# Patient Record
Sex: FEMALE | Race: WHITE | Employment: OTHER | URBAN - METROPOLITAN AREA
[De-identification: names, ages, dates, MRNs, and addresses within clinical notes are randomized per-mention and may not be internally consistent; named-entity substitution may affect disease eponyms.]

---

## 2020-08-14 ENCOUNTER — HOSPITAL ENCOUNTER (INPATIENT)
Age: 45
LOS: 1 days | Discharge: LEFT BEFORE DISCHARGE | DRG: 812 | End: 2020-08-15
Attending: INTERNAL MEDICINE | Admitting: FAMILY MEDICINE
Payer: SUBSIDIZED

## 2020-08-14 VITALS
WEIGHT: 126.3 LBS | SYSTOLIC BLOOD PRESSURE: 115 MMHG | BODY MASS INDEX: 20.3 KG/M2 | HEIGHT: 66 IN | TEMPERATURE: 97.7 F | DIASTOLIC BLOOD PRESSURE: 82 MMHG | RESPIRATION RATE: 16 BRPM | OXYGEN SATURATION: 100 % | HEART RATE: 81 BPM

## 2020-08-14 PROBLEM — E03.9 HYPOTHYROID: Status: ACTIVE | Noted: 2020-08-14

## 2020-08-14 PROBLEM — K92.2 GI BLEED: Status: ACTIVE | Noted: 2020-08-14

## 2020-08-14 PROCEDURE — 77030040393 HC DRSG OPTIFOAM GENT MDII -B

## 2020-08-14 PROCEDURE — 65660000000 HC RM CCU STEPDOWN

## 2020-08-14 RX ORDER — MORPHINE SULFATE 2 MG/ML
1 INJECTION, SOLUTION INTRAMUSCULAR; INTRAVENOUS
Status: DISCONTINUED | OUTPATIENT
Start: 2020-08-14 | End: 2020-08-15 | Stop reason: HOSPADM

## 2020-08-14 RX ORDER — ALPRAZOLAM 1 MG/1
1 TABLET ORAL
COMMUNITY

## 2020-08-14 RX ORDER — ACETAMINOPHEN 325 MG/1
650 TABLET ORAL
Status: DISCONTINUED | OUTPATIENT
Start: 2020-08-14 | End: 2020-08-15 | Stop reason: HOSPADM

## 2020-08-14 RX ORDER — ACETAMINOPHEN 650 MG/1
650 SUPPOSITORY RECTAL
Status: DISCONTINUED | OUTPATIENT
Start: 2020-08-14 | End: 2020-08-15 | Stop reason: HOSPADM

## 2020-08-14 RX ORDER — LEVOTHYROXINE SODIUM 100 UG/1
100 TABLET ORAL
Status: DISCONTINUED | OUTPATIENT
Start: 2020-08-15 | End: 2020-08-15 | Stop reason: HOSPADM

## 2020-08-14 RX ORDER — ALPRAZOLAM 0.5 MG/1
1 TABLET ORAL 2 TIMES DAILY
Status: DISCONTINUED | OUTPATIENT
Start: 2020-08-15 | End: 2020-08-15 | Stop reason: HOSPADM

## 2020-08-14 RX ORDER — POLYETHYLENE GLYCOL 3350 17 G/17G
17 POWDER, FOR SOLUTION ORAL DAILY PRN
Status: DISCONTINUED | OUTPATIENT
Start: 2020-08-14 | End: 2020-08-15 | Stop reason: HOSPADM

## 2020-08-14 RX ORDER — SODIUM CHLORIDE 0.9 % (FLUSH) 0.9 %
5-40 SYRINGE (ML) INJECTION AS NEEDED
Status: DISCONTINUED | OUTPATIENT
Start: 2020-08-14 | End: 2020-08-15 | Stop reason: HOSPADM

## 2020-08-14 RX ORDER — PROMETHAZINE HYDROCHLORIDE 25 MG/1
12.5 TABLET ORAL
Status: DISCONTINUED | OUTPATIENT
Start: 2020-08-14 | End: 2020-08-15 | Stop reason: HOSPADM

## 2020-08-14 RX ORDER — LEVOTHYROXINE SODIUM 100 UG/1
100 TABLET ORAL
COMMUNITY

## 2020-08-14 RX ORDER — ONDANSETRON 2 MG/ML
4 INJECTION INTRAMUSCULAR; INTRAVENOUS
Status: DISCONTINUED | OUTPATIENT
Start: 2020-08-14 | End: 2020-08-15 | Stop reason: HOSPADM

## 2020-08-14 RX ORDER — SODIUM CHLORIDE 0.9 % (FLUSH) 0.9 %
5-40 SYRINGE (ML) INJECTION EVERY 8 HOURS
Status: DISCONTINUED | OUTPATIENT
Start: 2020-08-15 | End: 2020-08-15 | Stop reason: HOSPADM

## 2020-08-14 RX ORDER — POTASSIUM CHLORIDE, DEXTROSE MONOHYDRATE AND SODIUM CHLORIDE 300; 5; 900 MG/100ML; G/100ML; MG/100ML
INJECTION, SOLUTION INTRAVENOUS CONTINUOUS
Status: DISCONTINUED | OUTPATIENT
Start: 2020-08-14 | End: 2020-08-15 | Stop reason: HOSPADM

## 2020-08-15 PROBLEM — F41.9 ANXIETY DISORDER: Status: ACTIVE | Noted: 2020-08-15

## 2020-08-15 PROBLEM — K21.9 ACID REFLUX: Status: ACTIVE | Noted: 2020-08-15

## 2020-08-15 PROBLEM — E87.6 HYPOKALEMIA: Status: ACTIVE | Noted: 2020-08-15

## 2020-08-15 NOTE — H&P
Hospitalist H&P Note     Admit Date:  2020  7:34 PM   Name:  Adri Yu   Age:  54 y.o.  :  1975   MRN:  [id number]   PCP:  No primary care provider on file. Treatment Team: Attending Provider: Cedrick Win MD  Rectal bleeding generalized weakness passed out on Wednesday  HPI:   42-year-old  female patient with a known history of anxiety disorder on Xanax, hypothyroidism and history of depression not on any medication for a month. History of left shoulder pain. Patient says since  she has been taking Goody powder at least 3 times a week for pain, says has difficulty swallowing pills. Patient says in  she was diagnosed with squamous cell cancer, according to patient she was told that it was just above the esophagus. Patient says she finished chemo and radiation, at one point had a PEG tube. She was told that she had few years to live. Patient stopped the treatment, said went natural treatment and  m[facility] in [place]. According to patient oncology send the patient to ENT for reevaluation, when ENT scoped the patient he noticed that there was no more cancer. Patient says she has not followed up with oncology since then. With history of cancer/radiation patient does say that she has narrowing of her esophagus, has to chew a lot before swallowing. Says has previously had procedure for esophageal stretching. On Wednesday patient was walking her dog and suddenly felt severe weakness associated with the sweating, blurred vision and the next thing she noticed was she was found on the sidewalk. Patient family had to lift her and take her to the house. That evening patient noticed abdominal cramping mostly upper abdomen, noticed to have bloody bowel movement. Tin Conklin 8-10 episodes of rectal bleeding on Wednesday, 4 episodes of bloody bowel movement Thursday. Says did not have a bowel movement on Friday.   Still has intermittent crampy sometimes feeling of somebody punching in the abdomen, mostly over the upper abdominal region. Does complain of mild nausea. She was seen at the urgent care and sent as a direct admit for GI bleed. Hemoccult positive at the urgent care. Apart from patient complaining of severe fatigue/generalized weakness, abdominal pain more over the upper abdominal region and mild nausea, of the 10 reactions apart from the one mentioned above patient other review of stones were negative noncontributory. Hemoglobin 10.6, potassium of 3.3. Hemoccult positive at the urgent care. Patient will be admitted for symptomatic anemia and hypokalemia. 10 systems reviewed and negative except as noted in HPI.   past medical history -hypothyroidism, acid reflux, anxiety, depression  past surgical history -says had a previous esophageal stretching procedure  Allergies   Allergen Reactions    Hydrocodone Nausea and Vomiting    Penicillins Rash    Sulfa (Sulfonamide Antibiotics) Rash      Social History     Tobacco Use    Smoking status: NIL   Substance Use Topics    Alcohol use: NIL       family history- HTN    There is no immunization history on file for this patient. PTA Medications:  Prior to Admission Medications   Prescriptions Last Dose Informant Patient Reported? Taking? ALPRAZolam (Xanax) 1 mg tablet   Yes Yes   Sig: Take 1 mg by mouth two (2) times daily as needed for Anxiety. levothyroxine (Synthroid) 100 mcg tablet   Yes Yes   Sig: Take 100 mcg by mouth Daily (before breakfast). Facility-Administered Medications: None       Objective:     Patient Vitals for the past 24 hrs:   Temp Pulse Resp BP SpO2   08/14/20 2239 97.7 °F (36.5 °C) 81 16 115/82 100 %   08/14/20 1956 98.1 °F (36.7 °C) 93 19 110/79 100 %     Oxygen Therapy  O2 Sat (%): 100 % (08/14/20 2239)  No intake or output data in the 24 hours ending 08/15/20 0019    Physical Exam:  General:    Well nourished. Alert. Eyes:   Normal sclera. Extraocular movements intact.   Pale pink conjunctiva  ENT:  Normocephalic, atraumatic. Dry mucous membranes  CV:   RRR. No murmur, rub, or gallop. Lungs:  CTAB. No wheezing, rhonchi, or rales. Abdomen: Tenderness over the epigastric region with mild guarding no rebound, mild mid abdominal tenderness. Normal bowel sounds  Extremities: Warm and dry. No cyanosis or edema. Neurologic: CN II-XII grossly intact. Sensation intact. Skin:     No rashes or jaundice. Psych:  Normal mood and affect. I reviewed the labs, imaging, EKGs, telemetry, and other studies done this admission. Data Review:   No results found for this or any previous visit (from the past 24 hour(s)). All Micro Results     None          Other Studies:  No results found. Assessment and Plan:     Hospital Problems as of 8/15/2020 Never Reviewed          Codes Class Noted - Resolved POA    Hypokalemia ICD-10-CM: E87.6  ICD-9-CM: 276.8  8/15/2020 - Present Unknown        Acid reflux ICD-10-CM: K21.9  ICD-9-CM: 530.81  8/15/2020 - Present Unknown        Anxiety disorder ICD-10-CM: F41.9  ICD-9-CM: 300.00  8/15/2020 - Present Unknown        * (Principal) GI bleed ICD-10-CM: K92.2  ICD-9-CM: 578.9  8/14/2020 - Present Unknown        Hypothyroid ICD-10-CM: E03.9  ICD-9-CM: 244.9  8/14/2020 - Present Unknown              PLAN:  -GI bleed-probably upper GI etiology, may be related to patient taking Goody powder, continue Protonix IV twice daily, GI consult in the morning, n.p.o. after midnight.  -Hypokalemia-replace  -Acid reflux. Says history of esophageal stretching  -Hypothyroidism  -Anxiety disorder    [facility], full code.     DVT ppx: SCD  Anticipated DC needs:    Code status:  Full  Estimated LOS:  Greater than 2 midnights  Risk:  high    Signed:  Anabelle Dooley MD

## 2020-08-15 NOTE — PROGRESS NOTES
Pt left AMA. Saw patient at elevator with all her belongings as she was attempting to leave hospital without informing staff and against medical advice, explained to pt the importance of her staying and receiving treatment, however patient stated she wanted to go home and was \"leaving now\".  Had patient sign AMA paper and notified MD.

## 2020-08-15 NOTE — PROGRESS NOTES
I was told by the Nursing staff that he saw pt with all her belongings leaving the hospital,saw pt at the elevator. Staff told that pt signed AMA(against medical advise) papers. Earlier today, I explained the pt the importance of staying in the hospital secondary to her GI bleeding and pt was ok to stay and treated. Tried to call phone number on the chart- no answer.

## 2020-09-17 ENCOUNTER — ANESTHESIA EVENT (OUTPATIENT)
Dept: ENDOSCOPY | Age: 45
End: 2020-09-17
Payer: SUBSIDIZED

## 2020-09-17 RX ORDER — SODIUM CHLORIDE 0.9 % (FLUSH) 0.9 %
5-40 SYRINGE (ML) INJECTION AS NEEDED
Status: CANCELLED | OUTPATIENT
Start: 2020-09-17

## 2020-09-17 RX ORDER — SODIUM CHLORIDE 0.9 % (FLUSH) 0.9 %
5-40 SYRINGE (ML) INJECTION EVERY 8 HOURS
Status: CANCELLED | OUTPATIENT
Start: 2020-09-17

## 2020-09-17 RX ORDER — SODIUM CHLORIDE, SODIUM LACTATE, POTASSIUM CHLORIDE, CALCIUM CHLORIDE 600; 310; 30; 20 MG/100ML; MG/100ML; MG/100ML; MG/100ML
100 INJECTION, SOLUTION INTRAVENOUS CONTINUOUS
Status: CANCELLED | OUTPATIENT
Start: 2020-09-17 | End: 2020-09-18

## 2020-09-17 RX ORDER — SODIUM CHLORIDE, SODIUM LACTATE, POTASSIUM CHLORIDE, CALCIUM CHLORIDE 600; 310; 30; 20 MG/100ML; MG/100ML; MG/100ML; MG/100ML
100 INJECTION, SOLUTION INTRAVENOUS CONTINUOUS
Status: CANCELLED | OUTPATIENT
Start: 2020-09-17

## 2020-09-17 RX ORDER — LIDOCAINE HYDROCHLORIDE 10 MG/ML
0.1 INJECTION INFILTRATION; PERINEURAL AS NEEDED
Status: CANCELLED | OUTPATIENT
Start: 2020-09-17

## 2020-09-18 ENCOUNTER — ANESTHESIA (OUTPATIENT)
Dept: ENDOSCOPY | Age: 45
End: 2020-09-18
Payer: SUBSIDIZED

## 2020-09-18 ENCOUNTER — HOSPITAL ENCOUNTER (OUTPATIENT)
Age: 45
Setting detail: OUTPATIENT SURGERY
Discharge: HOME | End: 2020-09-18
Attending: INTERNAL MEDICINE | Admitting: INTERNAL MEDICINE
Payer: SUBSIDIZED

## 2020-09-18 VITALS
RESPIRATION RATE: 20 BRPM | SYSTOLIC BLOOD PRESSURE: 112 MMHG | TEMPERATURE: 98.6 F | HEART RATE: 78 BPM | DIASTOLIC BLOOD PRESSURE: 83 MMHG | OXYGEN SATURATION: 90 %

## 2020-09-18 PROCEDURE — 74011250636 HC RX REV CODE- 250/636: Performed by: NURSE ANESTHETIST, CERTIFIED REGISTERED

## 2020-09-18 PROCEDURE — 88305 TISSUE EXAM BY PATHOLOGIST: CPT

## 2020-09-18 PROCEDURE — 2709999900 HC NON-CHARGEABLE SUPPLY: Performed by: INTERNAL MEDICINE

## 2020-09-18 PROCEDURE — 77030020018 HC MRKR ENDOSC SPOT 5ML SYR GISP -B: Performed by: INTERNAL MEDICINE

## 2020-09-18 PROCEDURE — 77030013991 HC SNR POLYP ENDOSC BSC -A: Performed by: INTERNAL MEDICINE

## 2020-09-18 PROCEDURE — 77030029929: Performed by: INTERNAL MEDICINE

## 2020-09-18 PROCEDURE — 76040000027: Performed by: INTERNAL MEDICINE

## 2020-09-18 PROCEDURE — 76060000034 HC ANESTHESIA 1.5 TO 2 HR: Performed by: INTERNAL MEDICINE

## 2020-09-18 PROCEDURE — 74011000250 HC RX REV CODE- 250: Performed by: NURSE ANESTHETIST, CERTIFIED REGISTERED

## 2020-09-18 RX ORDER — CYCLOBENZAPRINE HCL 5 MG
5 TABLET ORAL
COMMUNITY
End: 2021-02-17

## 2020-09-18 RX ORDER — PROPOFOL 10 MG/ML
INJECTION, EMULSION INTRAVENOUS
Status: DISCONTINUED | OUTPATIENT
Start: 2020-09-18 | End: 2020-09-18 | Stop reason: HOSPADM

## 2020-09-18 RX ORDER — LIDOCAINE HYDROCHLORIDE 20 MG/ML
INJECTION, SOLUTION EPIDURAL; INFILTRATION; INTRACAUDAL; PERINEURAL AS NEEDED
Status: DISCONTINUED | OUTPATIENT
Start: 2020-09-18 | End: 2020-09-18 | Stop reason: HOSPADM

## 2020-09-18 RX ORDER — PROPOFOL 10 MG/ML
INJECTION, EMULSION INTRAVENOUS AS NEEDED
Status: DISCONTINUED | OUTPATIENT
Start: 2020-09-18 | End: 2020-09-18 | Stop reason: HOSPADM

## 2020-09-18 RX ORDER — SODIUM CHLORIDE, SODIUM LACTATE, POTASSIUM CHLORIDE, CALCIUM CHLORIDE 600; 310; 30; 20 MG/100ML; MG/100ML; MG/100ML; MG/100ML
INJECTION, SOLUTION INTRAVENOUS
Status: DISCONTINUED | OUTPATIENT
Start: 2020-09-18 | End: 2020-09-18 | Stop reason: HOSPADM

## 2020-09-18 RX ORDER — DULOXETIN HYDROCHLORIDE 20 MG/1
20 CAPSULE, DELAYED RELEASE ORAL DAILY
COMMUNITY
End: 2021-02-17

## 2020-09-18 RX ADMIN — PROPOFOL 60 MG: 10 INJECTION, EMULSION INTRAVENOUS at 12:34

## 2020-09-18 RX ADMIN — PROPOFOL 30 MG: 10 INJECTION, EMULSION INTRAVENOUS at 12:54

## 2020-09-18 RX ADMIN — SODIUM CHLORIDE, SODIUM LACTATE, POTASSIUM CHLORIDE, AND CALCIUM CHLORIDE: 600; 310; 30; 20 INJECTION, SOLUTION INTRAVENOUS at 12:30

## 2020-09-18 RX ADMIN — PROPOFOL 10 MG: 10 INJECTION, EMULSION INTRAVENOUS at 12:36

## 2020-09-18 RX ADMIN — LIDOCAINE HYDROCHLORIDE 100 MG: 20 INJECTION, SOLUTION EPIDURAL; INFILTRATION; INTRACAUDAL; PERINEURAL at 12:34

## 2020-09-18 RX ADMIN — PROPOFOL 100 MCG/KG/MIN: 10 INJECTION, EMULSION INTRAVENOUS at 12:34

## 2020-09-18 NOTE — H&P
History and Physical      Patient: [facility]    Physician: Tramaine Sanchez MD    Referring Physician: Danielle Allen NP    Chief Complaint: For colonoscopy and EGD    History of Present Illness: Pt presents for colonoscopy. Pt with lower abdominal pain, constipation, rectal bleeding, Nausea, GERD, and dysphagia with hx of esophageal cancer. History:  Past Medical History:   Diagnosis Date    Cancer ([address]     Throat tumor    GERD (gastroesophageal reflux disease)     Ill-defined condition     Episodes of fainting    Psychiatric disorder      Past Surgical History:   Procedure Laterality Date    HX GYN      tubal    HX OTHER SURGICAL      EGD with dilation      Social History     Socioeconomic History    Marital status: LEGALLY      Spouse name: Not on file    Number of children: Not on file    Years of education: Not on file    Highest education level: Not on file   Tobacco Use    Smoking status: Current Some Day Smoker    Smokeless tobacco: Never Used   Substance and Sexual Activity    Alcohol use: Never     Frequency: Never      History reviewed. No pertinent family history. Medications:   Prior to Admission medications    Medication Sig Start Date End Date Taking? Authorizing Provider   DULoxetine (Cymbalta) 20 mg capsule Take 20 mg by mouth daily. Yes Provider, Historical   cyclobenzaprine (FLEXERIL) 5 mg tablet Take 5 mg by mouth. Yes Provider, Historical   levothyroxine (Synthroid) 100 mcg tablet Take 100 mcg by mouth Daily (before breakfast). Yes Provider, Historical   ALPRAZolam (Xanax) 1 mg tablet Take 1 mg by mouth two (2) times daily as needed for Anxiety. Yes Provider, Historical       Allergies:    Allergies   Allergen Reactions    Adhesive Rash    Hydrocodone Nausea and Vomiting    Penicillins Rash    Sulfa (Sulfonamide Antibiotics) Rash       Physical Exam:     Vital Signs:   Visit Vitals  BP 99/66   Pulse 88   Temp 97.9 °F (36.6 °C) Resp 16   SpO2 98%   Breastfeeding Yes     . General: no distress      Heart: regular   Lungs: unlabored   Abdominal: soft   Neurological: Grossly normal        Findings/Diagnosis: Abdominal pain, Rectal bleed, Constipation, GERD, Nausea, Dysphagia    Plan of Care/Planned Procedure: Colonoscopy, possible polypectomy and EGD with dilation. Pt/designee has reviewed the colonoscopy information sheet. Any questions have been discussed. They agree to proceed.       Signed:  Vern Hamlin MD   9/18/2020

## 2020-09-18 NOTE — ANESTHESIA POSTPROCEDURE EVALUATION
Procedure(s):  ESOPHAGOGASTRODUODENOSCOPY (EGD) WITH DILATION  COLONOSCOPY/ 20  ESOPHAGEAL DILATION Savory Dilators  ENDOSCOPIC POLYPECTOMY  INJECTION tattoo.     total IV anesthesia    Anesthesia Post Evaluation        Patient location during evaluation: PACU  Patient participation: complete - patient participated  Level of consciousness: awake and alert  Pain management: adequate  Airway patency: patent  Anesthetic complications: no  Cardiovascular status: acceptable  Respiratory status: acceptable  Hydration status: acceptable  Post anesthesia nausea and vomiting:  controlled  Final Post Anesthesia Temperature Assessment:  Normothermia (36.0-37.5 degrees C)      INITIAL Post-op Vital signs:   Vitals Value Taken Time   /83 9/18/2020  2:37 PM   Temp 37 °C (98.6 °F) 9/18/2020  2:03 PM   Pulse 78 9/18/2020  2:37 PM   Resp 20 9/18/2020  2:37 PM   SpO2 90 % 9/18/2020  2:37 PM

## 2020-09-18 NOTE — DISCHARGE INSTRUCTIONS
Gastrointestinal Colonoscopy/Flexible Sigmoidoscopy - Lower Exam Discharge Instructions  1. Call Dr. Jeff Funes office for any problems or questions. 2. Contact the doctors office for follow up appointment as directed  3. Medication may cause drowsiness for several hours, therefore:  · Do not drive or operate machinery for reminder of the day. · No alcohol today. · Do not make any important or legal decisions for 24 hours. · Do not sign any legal documents for 24 hours. 4. Ordinarily, you may resume regular diet and activity after exam unless otherwise specified by your physician. 5. Because of air put into your colon during exam, you may experience some abdominal distension, relieved by the passage of gas, for several hours. 6. Contact your physician if you have any of the following:  · Excessive amount of bleeding - large amount when having a bowel movement. Occasional specks of blood with bowel movement would not be unusual.  · Severe abdominal pain  · Fever or Chills  7. Polyp Removal - follow these additional instructions  · Clear liquid diet for the next meal (jell-o, broth, clear drinks)  · Soft diet for 24 hours, then resume regular diet   · Take Metamucil - 1 tablespoon in juice every morning for 3 days  · No Aspirin, Advil, Aleve, Nuprin, Ibuprofen, or medications that contain these drugs for 2 weeks. Any additional instructions:    1. Office will follow up with pathology  2. Repeat colonoscopy in six months             . Gastrointestinal Esophagogastroduodenoscopy (EGD) - Upper Exam Discharge Instructions    1. Call Dr. Jeff Funes office for any problems or questions. 2. Contact the doctor's office for follow up appointment as directed. 3. Medication may cause drowsiness for several hours, therefore:  · Do not drive or operate machinery for remainder of the day. · No alcohol today. · Do not make any important or legal decisions for 24 hours.   · Do not sign any legal documents for 24 hours.    5. Ordinarily, you may resume regular diet and activity after exam unless otherwise              specified by your physician. 6. For mild soreness in your throat you may use Cepacol throat lozenges or warm               salt-water gargles as needed. Any additional instructions: 1. Soft diet today, advance as tolerated  2. Repeat dilation in six weeks. Instructions given to [facility] and other family members.

## 2020-09-18 NOTE — PROCEDURES
DATE OF PROCEDURE: 9/18/2020    REQUESTING PHYSICIAN: Shanthi Lawson    PROCEDURE: Colonoscopy. ENDOSCOPIST: Levonia Mcardle, M.D. PREOPERATIVE DIAGNOSIS: Lower abdominal pain, Constipation, Rectal bleed    POSTOPERATIVE DIAGNOSIS: Multiple polys, Diverticulosis, Internal hemorrhoids    SEDATION: MAC    INSTRUMENT: PCF H190    DESCRIPTION OF PROCEDURE:  After informed consent was obtained the patient was placed in the left lateral decubitus position. Intravenous sedation was administered as above. After adequate sedation had been achieved, digital rectal examination was performed. The colonoscope was then inserted through the anus and followed the course of the rectum and colon to the cecum, which was confirmed by visualization of the ileocecal valve and appendiceal orifice. The colonoscope was withdrawn from that point, performing a careful survey of the mucosa. Retroflexion was performed in the rectum. FINDINGS:  Normal appearing mucosa from rectum to cecum without inflammation. Normal TI mucosa. Left sided diverticulosis  Internal hemorrhoids  Ascending colon: 10mm, 9mm, 11mm, 10mm polyps- hot and cold snare. Transverse colon: 20mm (hot snare in pieces), 8mm polyp- cold snare. Descending colon: 12mm, 8mm polyps- hot and cold snare. Sigmoid colon: Six smaller polyps (5-7mm)- cold snare. Multiple smaller polyps left in place.     Estimated Blood Loss:  0 cc-min    IMPRESSION:  Multiple colon polyps  Diverticulosis  Internal hemorrhoids     PLAN:  - F/u path  - Repeat colo in 6mos  - Consider genetic testing     REINALDO Sheppard MD

## 2020-09-18 NOTE — ANESTHESIA PREPROCEDURE EVALUATION
Relevant Problems   No relevant active problems       Anesthetic History   No history of anesthetic complications            Review of Systems / Medical History  Patient summary reviewed and pertinent labs reviewed    Pulmonary          Smoker         Neuro/Psych         Psychiatric history     Cardiovascular                  Exercise tolerance: >4 METS     GI/Hepatic/Renal     GERD           Endo/Other      Hypothyroidism: well controlled    Pertinent negatives: Cancer: throat 15 years ago.     Other Findings              Physical Exam    Airway  Mallampati: II  TM Distance: 4 - 6 cm  Neck ROM: normal range of motion   Mouth opening: Normal     Cardiovascular  Regular rate and rhythm,  S1 and S2 normal,  no murmur, click, rub, or gallop             Dental    Dentition: Full upper dentures     Pulmonary  Breath sounds clear to auscultation               Abdominal         Other Findings            Anesthetic Plan    ASA: 3  Anesthesia type: total IV anesthesia          Induction: Intravenous  Anesthetic plan and risks discussed with: Patient

## 2020-09-18 NOTE — ROUTINE PROCESS
VSS. Patient denies any complaints at this time. Discharge education provided to patient and fiance. Patient discharged via wheelchair with RN.

## 2020-09-18 NOTE — PROCEDURES
DATE OF PROCEDURE: 9/18/2020    REQUESTING PHYSICIAN: Shanthi Lawson    PROCEDURE: Esophagogastroduodenoscopy. ENDOSCOPIST: Anthony Belcher M.D. PREOPERATIVE DIAGNOSIS: Dysphagia, GERD, Nausea, Hx of esophageal cancer    POSTOPERATIVE DIAGNOSIS: Esophageal stricture    INSTRUMENTS: GIF H190, XP    SEDATION: MAC    DESCRIPTION: After informed consent was obtained, the patient was taken to the endoscopy suite and placed in the left lateral decubitus position. Intravenous sedation was administered by the Anesthesia provider. After adequate sedation had been achieved the endoscope was inserted over the tongue and through the posterior pharynx under direct visualization down the esophagus to the stomach and into the second portion of the duodenum. The endoscopic was withdrawn from that point, performing a careful survey of the mucosa. Retroflexion was performed in the gastric fundus. FINDINGS:  Esophagus: Upon intubating the esophagus, there was very tight, benign-appearing, stricture for which the standard EGD scope could not traverse. Therefore, I changed to an XP scope and dilated with a Savary (10,11,12,12.8mm) with expected heme/trauma present. Stomach: Normal gastric mucosa. Duodenum: Normal duodenal mucosa.      Estimated blood loss:  0 cc-minimal    IMPRESSION:   Esophageal stricture    PLAN:  - Soft diet today, advance as tolerated  - Repeat dilation in 6wks  - After next dilation, start OTC omeprazole 20mg daily    REINALDO Guan MD

## 2020-10-29 ENCOUNTER — HOSPITAL ENCOUNTER (OUTPATIENT)
Dept: SURGERY | Age: 45
Discharge: HOME | End: 2020-10-29

## 2021-01-04 ENCOUNTER — HOSPITAL ENCOUNTER (OUTPATIENT)
Dept: SURGERY | Age: 46
Discharge: HOME | End: 2021-01-04

## 2021-02-12 ENCOUNTER — HOSPITAL ENCOUNTER (OUTPATIENT)
Dept: SURGERY | Age: 46
Discharge: HOME | End: 2021-02-12

## 2021-02-17 VITALS — WEIGHT: 125 LBS | BODY MASS INDEX: 20.09 KG/M2 | HEIGHT: 66 IN

## 2021-02-17 RX ORDER — CITALOPRAM 40 MG/1
40 TABLET, FILM COATED ORAL DAILY
COMMUNITY

## 2021-02-17 NOTE — PERIOP NOTES
Patient verified name, , and procedure.    Type: 1a; abbreviated assessment per anesthesia guidelines  Labs per surgeon: none  Labs per anesthesia: none    A negative Covid swab result is required to proceed with surgery; appointments are made by the surgeon office and test should be collected 7 days prior to surgery. The testing center is located at the [address]     Instructed pt that they will be notified by the doctor office for time of arrival to GI lab. If any questions please call the GI lab at [phone].    Follow diet and prep instructions per office. May have clear liquids until 4 hours prior to time of arrival.    Bath or shower the night before and the am of surgery with antibacterial soap. No lotions, oils, powders, cologne on skin. No make up, eye make up or jewelry. Wear loose fitting comfortable, clean clothing.     Must have adult present in building the entire time .     Medications for the day of procedure Xanax prn, Celexa, Synthroid    The following discharge instructions reviewed with patient: medication given during procedure may cause drowsiness for several hours, therefore, do not drive or operate machinery for remainder of the day, no alcohol on the day of your procedure, resume regular diet and activity unless otherwise directed, for mild sore throat you may use Cepacol throat lozenges or warm salt water gargles as needed, call your physician for any problems or questions. Patient verbalizes understanding.

## 2021-02-19 ENCOUNTER — ANESTHESIA (OUTPATIENT)
Dept: ENDOSCOPY | Age: 46
End: 2021-02-19
Payer: SUBSIDIZED

## 2021-02-19 ENCOUNTER — HOSPITAL ENCOUNTER (OUTPATIENT)
Age: 46
Setting detail: OUTPATIENT SURGERY
Discharge: HOME | End: 2021-02-19
Attending: INTERNAL MEDICINE | Admitting: INTERNAL MEDICINE
Payer: SUBSIDIZED

## 2021-02-19 ENCOUNTER — ANESTHESIA EVENT (OUTPATIENT)
Dept: ENDOSCOPY | Age: 46
End: 2021-02-19
Payer: SUBSIDIZED

## 2021-02-19 VITALS
BODY MASS INDEX: 20.87 KG/M2 | RESPIRATION RATE: 18 BRPM | DIASTOLIC BLOOD PRESSURE: 63 MMHG | WEIGHT: 129.3 LBS | HEART RATE: 87 BPM | SYSTOLIC BLOOD PRESSURE: 106 MMHG | OXYGEN SATURATION: 100 % | TEMPERATURE: 98 F

## 2021-02-19 PROCEDURE — 2709999900 HC NON-CHARGEABLE SUPPLY: Performed by: INTERNAL MEDICINE

## 2021-02-19 PROCEDURE — 76040000025: Performed by: INTERNAL MEDICINE

## 2021-02-19 PROCEDURE — 74011250636 HC RX REV CODE- 250/636: Performed by: ANESTHESIOLOGY

## 2021-02-19 PROCEDURE — 74011250636 HC RX REV CODE- 250/636

## 2021-02-19 PROCEDURE — 76060000031 HC ANESTHESIA FIRST 0.5 HR: Performed by: INTERNAL MEDICINE

## 2021-02-19 RX ORDER — ONDANSETRON 2 MG/ML
4 INJECTION INTRAMUSCULAR; INTRAVENOUS ONCE
Status: DISCONTINUED | OUTPATIENT
Start: 2021-02-19 | End: 2021-02-19 | Stop reason: HOSPADM

## 2021-02-19 RX ORDER — OXYCODONE HYDROCHLORIDE 5 MG/1
5 TABLET ORAL
Status: DISCONTINUED | OUTPATIENT
Start: 2021-02-19 | End: 2021-02-19 | Stop reason: HOSPADM

## 2021-02-19 RX ORDER — LIDOCAINE HYDROCHLORIDE 10 MG/ML
0.1 INJECTION INFILTRATION; PERINEURAL AS NEEDED
Status: DISCONTINUED | OUTPATIENT
Start: 2021-02-19 | End: 2021-02-19 | Stop reason: HOSPADM

## 2021-02-19 RX ORDER — FENTANYL CITRATE 50 UG/ML
100 INJECTION, SOLUTION INTRAMUSCULAR; INTRAVENOUS AS NEEDED
Status: DISCONTINUED | OUTPATIENT
Start: 2021-02-19 | End: 2021-02-19 | Stop reason: HOSPADM

## 2021-02-19 RX ORDER — NALOXONE HYDROCHLORIDE 0.4 MG/ML
0.1 INJECTION, SOLUTION INTRAMUSCULAR; INTRAVENOUS; SUBCUTANEOUS AS NEEDED
Status: DISCONTINUED | OUTPATIENT
Start: 2021-02-19 | End: 2021-02-19 | Stop reason: HOSPADM

## 2021-02-19 RX ORDER — DIPHENHYDRAMINE HYDROCHLORIDE 50 MG/ML
12.5 INJECTION, SOLUTION INTRAMUSCULAR; INTRAVENOUS ONCE
Status: DISCONTINUED | OUTPATIENT
Start: 2021-02-19 | End: 2021-02-19 | Stop reason: HOSPADM

## 2021-02-19 RX ORDER — HYDROMORPHONE HYDROCHLORIDE 2 MG/ML
0.5 INJECTION, SOLUTION INTRAMUSCULAR; INTRAVENOUS; SUBCUTANEOUS
Status: DISCONTINUED | OUTPATIENT
Start: 2021-02-19 | End: 2021-02-19 | Stop reason: HOSPADM

## 2021-02-19 RX ORDER — PROPOFOL 10 MG/ML
INJECTION, EMULSION INTRAVENOUS AS NEEDED
Status: DISCONTINUED | OUTPATIENT
Start: 2021-02-19 | End: 2021-02-19 | Stop reason: HOSPADM

## 2021-02-19 RX ORDER — OXYCODONE HYDROCHLORIDE 5 MG/1
10 TABLET ORAL
Status: DISCONTINUED | OUTPATIENT
Start: 2021-02-19 | End: 2021-02-19 | Stop reason: HOSPADM

## 2021-02-19 RX ORDER — SODIUM CHLORIDE, SODIUM LACTATE, POTASSIUM CHLORIDE, CALCIUM CHLORIDE 600; 310; 30; 20 MG/100ML; MG/100ML; MG/100ML; MG/100ML
1000 INJECTION, SOLUTION INTRAVENOUS CONTINUOUS
Status: DISCONTINUED | OUTPATIENT
Start: 2021-02-19 | End: 2021-02-19 | Stop reason: HOSPADM

## 2021-02-19 RX ORDER — PROPOFOL 10 MG/ML
INJECTION, EMULSION INTRAVENOUS
Status: DISCONTINUED | OUTPATIENT
Start: 2021-02-19 | End: 2021-02-19 | Stop reason: HOSPADM

## 2021-02-19 RX ORDER — SODIUM CHLORIDE, SODIUM LACTATE, POTASSIUM CHLORIDE, CALCIUM CHLORIDE 600; 310; 30; 20 MG/100ML; MG/100ML; MG/100ML; MG/100ML
75 INJECTION, SOLUTION INTRAVENOUS CONTINUOUS
Status: DISCONTINUED | OUTPATIENT
Start: 2021-02-19 | End: 2021-02-19 | Stop reason: HOSPADM

## 2021-02-19 RX ORDER — MIDAZOLAM HYDROCHLORIDE 1 MG/ML
2 INJECTION, SOLUTION INTRAMUSCULAR; INTRAVENOUS
Status: DISCONTINUED | OUTPATIENT
Start: 2021-02-19 | End: 2021-02-19 | Stop reason: HOSPADM

## 2021-02-19 RX ADMIN — SODIUM CHLORIDE, SODIUM LACTATE, POTASSIUM CHLORIDE, AND CALCIUM CHLORIDE 1000 ML: 600; 310; 30; 20 INJECTION, SOLUTION INTRAVENOUS at 12:41

## 2021-02-19 RX ADMIN — PROPOFOL 70 MG: 10 INJECTION, EMULSION INTRAVENOUS at 13:05

## 2021-02-19 RX ADMIN — PROPOFOL 140 MCG/KG/MIN: 10 INJECTION, EMULSION INTRAVENOUS at 13:03

## 2021-02-19 NOTE — H&P
History and Physical      Patient: Danielle Hinton    Physician: Dixie Rivera MD    Referring Physician: Ria Gomez NP    Chief Complaint: For EGD    History of Present Illness: Pt presents for EGD. Pt with esophageal stricture with dysphagia. History:  Past Medical History:   Diagnosis Date    Anxiety     Cancer ([address] 11/05/2019    Throat tumor. Diagnosed 2006 was not surgical- had radiation. Was sent home on hospice. In remission- went to a [facility] warrior and was healed per Care Everywhere pt summary    GERD (gastroesophageal reflux disease)     Hypothyroid     Ill-defined condition     Episodes of fainting     Past Surgical History:   Procedure Laterality Date    COLONOSCOPY N/A 9/18/2020    COLONOSCOPY/ 20 performed by [facility] at [facility] ENDOSCOPY     [address] HX CHOLECYSTECTOMY      HX GI      PEG place - later removed    HX OTHER SURGICAL  09/18/2020    EGD with dilation    HX TUBAL LIGATION      HX VASCULAR ACCESS      Port placed - later removed      Social History     Socioeconomic History    Marital status: LEGALLY      Spouse name: Not on file    Number of children: Not on file    Years of education: Not on file    Highest education level: Not on file   Tobacco Use    Smoking status: Current Some Day Smoker    Smokeless tobacco: Never Used   Substance and Sexual Activity    Alcohol use: Never     Frequency: Never    Drug use: Not Currently      History reviewed. No pertinent family history. Medications:   Prior to Admission medications    Medication Sig Start Date End Date Taking? Authorizing Provider   OTHER,NON-FORMULARY, colloidial silver, essential oils and aloe vera dialy   Yes Provider, Historical   levothyroxine (Synthroid) 100 mcg tablet Take 100 mcg by mouth Daily (before breakfast). Take / use AM day of surgery  per anesthesia protocols.    Yes Provider, Historical   ALPRAZolam (Xanax) 1 mg tablet Take 1 mg by mouth two (2) times daily as needed for Anxiety. Take / use AM day of surgery  per anesthesia protocols PRN   Yes Provider, Historical   citalopram (CeleXA) 40 mg tablet Take 40 mg by mouth daily. Take / use AM day of surgery  per anesthesia protocols. Provider, Historical       Allergies: Allergies   Allergen Reactions    Adhesive Rash    Hydrocodone Nausea and Vomiting    Penicillins Rash    Sulfa (Sulfonamide Antibiotics) Rash       Physical Exam:     Vital Signs:   Visit Vitals  /71   Pulse 98   Temp 97.9 °F (36.6 °C)   Resp 18   Wt 58.7 kg (129 lb 4.8 oz)   SpO2 99%   BMI 20.87 kg/m²     . General: no distress      Heart: regular   Lungs: unlabored   Abdominal: soft   Neurological: Grossly normal        Findings/Diagnosis: Dysphagia, Esophageal stricture    Plan of Care/Planned Procedure: EGD with dilation. Pt/designee has reviewed the colonoscopy information sheet. Any questions have been discussed. They agree to proceed.       Signed:  Alyssa Aguilar MD   2/19/2021

## 2021-02-19 NOTE — ANESTHESIA PREPROCEDURE EVALUATION
Relevant Problems   NEUROLOGY   (+) Anxiety disorder      GASTROINTESTINAL   (+) Acid reflux      ENDOCRINE   (+) Hypothyroid       Anesthetic History   No history of anesthetic complications            Review of Systems / Medical History  Patient summary reviewed and pertinent labs reviewed    Pulmonary          Smoker         Neuro/Psych         Psychiatric history     Cardiovascular                  Exercise tolerance: >4 METS     GI/Hepatic/Renal     GERD           Endo/Other      Hypothyroidism: well controlled    Pertinent negatives: Cancer: throat 15 years ago.     Other Findings              Physical Exam    Airway  Mallampati: II  TM Distance: 4 - 6 cm  Neck ROM: normal range of motion   Mouth opening: Normal     Cardiovascular  Regular rate and rhythm,  S1 and S2 normal,  no murmur, click, rub, or gallop             Dental    Dentition: Full upper dentures     Pulmonary  Breath sounds clear to auscultation               Abdominal         Other Findings            Anesthetic Plan    ASA: 3  Anesthesia type: total IV anesthesia          Induction: Intravenous  Anesthetic plan and risks discussed with: Patient

## 2021-02-19 NOTE — PROCEDURES
PROCEDURE: Esophagogastroduodenoscopy    ENDOSCOPIST: Noé Kelley M.D. PREOPERATIVE DIAGNOSIS: Dysphagia, Esophageal stricture     POSTOPERATIVE DIAGNOSIS:     INSTRUMENTS: GIF H190    SEDATION: MAC    DESCRIPTION: After informed consent was obtained, the patient was taken to the endoscopy suite and placed in the left lateral decubitus position. Intravenous sedation was administered by the Anesthesia provider. After adequate sedation had been achieved the endoscope was inserted over the tongue and through the posterior pharynx under direct visualization down the esophagus to the stomach and into the second portion of the duodenum. The endoscopic was withdrawn from that point, performing a careful survey of the mucosa. Retroflexion was performed in the gastric fundus. FINDINGS:  Esophagus: Proximal stricture again seen but the scope was able to traverse (improved) with some resistance and mild trauma. Expected heme/trauma present after passing Savary (12,13,14mm). Therefore, no further dilation done. Stomach: Normal gastric mucosa. Duodenum: Normal duodenal mucosa.      Estimated blood loss:  0 cc-minimal    IMPRESSION:   Esophageal stricture (radiation induced)    PLAN:  - Soft diet today, advance as tolerated  - Will arrange repeat dilation with colonoscopy  - Will consider genetic testing    REINALDO Burnett MD

## 2021-02-19 NOTE — DISCHARGE INSTRUCTIONS
Gastrointestinal Esophagogastroduodenoscopy (EGD) - Upper Exam Discharge Instructions    1. Call Dr. Nicci Lawton at office for any problems or questions. 2. Contact the doctor's office for follow up appointment as directed. 3. Medication may cause drowsiness for several hours, therefore:  · Do not drive or operate machinery for remainder of the day. · No alcohol today. · Do not make any important or legal decisions for 24 hours. · Do not sign any legal documents for 24 hours. 5. Ordinarily, you may resume regular diet and activity after exam unless otherwise              specified by your physician. 6. For mild soreness in your throat you may use Cepacol throat lozenges or warm               salt-water gargles as needed. Any additional instructions:  ***     Instructions given to [employer] and other family members.   Instructions given by:  Yina Valderrama RN

## 2021-02-19 NOTE — ANESTHESIA POSTPROCEDURE EVALUATION
Procedure(s):  ESOPHAGOGASTRODUODENOSCOPY (EGD) WITH DILATION/BMI 20  ESOPHAGEAL DILATION.    total IV anesthesia    Anesthesia Post Evaluation        Patient location during evaluation: bedside  Patient participation: complete - patient participated  Level of consciousness: responsive to verbal stimuli  Pain management: satisfactory to patient  Airway patency: patent  Anesthetic complications: no  Cardiovascular status: hemodynamically stable  Respiratory status: spontaneous ventilation  Hydration status: stable    Final Post Anesthesia Temperature Assessment:  Normothermia (36.0-37.5 degrees C)      INITIAL Post-op Vital signs:   Vitals Value Taken Time   /64 02/19/21 1331   Temp 36.2 °C (97.2 °F) 02/19/21 1319   Pulse 89 02/19/21 1331   Resp 18 02/19/21 1331   SpO2 100 % 02/19/21 1331

## 2021-03-22 NOTE — PROGRESS NOTES
Called patient and left a voicemail to call [phone] [facility] Endoscopy department. Patient did not show for her covid test that was scheduled 3/17/21.

## 2021-03-23 ENCOUNTER — ANESTHESIA EVENT (OUTPATIENT)
Dept: ENDOSCOPY | Age: 46
End: 2021-03-23
Payer: SUBSIDIZED

## 2021-03-23 NOTE — PROGRESS NOTES
Informed patient of [phone] arrival time for 1400 procedure. Informed patient of COVID protocols. Informed patient that they must have a  with them the entire time that they are in the hospital. Patient verbalized understanding.

## 2021-03-24 ENCOUNTER — HOSPITAL ENCOUNTER (OUTPATIENT)
Age: 46
Setting detail: OUTPATIENT SURGERY
Discharge: HOME | End: 2021-03-24
Attending: INTERNAL MEDICINE | Admitting: INTERNAL MEDICINE
Payer: SUBSIDIZED

## 2021-03-24 ENCOUNTER — ANESTHESIA (OUTPATIENT)
Dept: ENDOSCOPY | Age: 46
End: 2021-03-24
Payer: SUBSIDIZED

## 2021-03-24 VITALS
TEMPERATURE: 97.7 F | HEIGHT: 66 IN | SYSTOLIC BLOOD PRESSURE: 101 MMHG | OXYGEN SATURATION: 98 % | DIASTOLIC BLOOD PRESSURE: 73 MMHG | WEIGHT: 127 LBS | RESPIRATION RATE: 17 BRPM | HEART RATE: 79 BPM | BODY MASS INDEX: 20.41 KG/M2

## 2021-03-24 PROCEDURE — 2709999900 HC NON-CHARGEABLE SUPPLY: Performed by: INTERNAL MEDICINE

## 2021-03-24 PROCEDURE — 74011250636 HC RX REV CODE- 250/636: Performed by: ANESTHESIOLOGY

## 2021-03-24 PROCEDURE — 88305 TISSUE EXAM BY PATHOLOGIST: CPT

## 2021-03-24 PROCEDURE — 76040000026: Performed by: INTERNAL MEDICINE

## 2021-03-24 PROCEDURE — 77030021593 HC FCPS BIOP ENDOSC BSC -A: Performed by: INTERNAL MEDICINE

## 2021-03-24 PROCEDURE — 76060000032 HC ANESTHESIA 0.5 TO 1 HR: Performed by: INTERNAL MEDICINE

## 2021-03-24 PROCEDURE — 77030013991 HC SNR POLYP ENDOSC BSC -A: Performed by: INTERNAL MEDICINE

## 2021-03-24 PROCEDURE — 74011000250 HC RX REV CODE- 250: Performed by: NURSE ANESTHETIST, CERTIFIED REGISTERED

## 2021-03-24 PROCEDURE — 88312 SPECIAL STAINS GROUP 1: CPT

## 2021-03-24 PROCEDURE — 74011250636 HC RX REV CODE- 250/636: Performed by: NURSE ANESTHETIST, CERTIFIED REGISTERED

## 2021-03-24 RX ORDER — PROPOFOL 10 MG/ML
INJECTION, EMULSION INTRAVENOUS
Status: DISCONTINUED | OUTPATIENT
Start: 2021-03-24 | End: 2021-03-24 | Stop reason: HOSPADM

## 2021-03-24 RX ORDER — PROPOFOL 10 MG/ML
INJECTION, EMULSION INTRAVENOUS AS NEEDED
Status: DISCONTINUED | OUTPATIENT
Start: 2021-03-24 | End: 2021-03-24 | Stop reason: HOSPADM

## 2021-03-24 RX ORDER — SODIUM CHLORIDE, SODIUM LACTATE, POTASSIUM CHLORIDE, CALCIUM CHLORIDE 600; 310; 30; 20 MG/100ML; MG/100ML; MG/100ML; MG/100ML
100 INJECTION, SOLUTION INTRAVENOUS CONTINUOUS
Status: DISCONTINUED | OUTPATIENT
Start: 2021-03-24 | End: 2021-03-24 | Stop reason: HOSPADM

## 2021-03-24 RX ORDER — LIDOCAINE HYDROCHLORIDE 20 MG/ML
INJECTION, SOLUTION EPIDURAL; INFILTRATION; INTRACAUDAL; PERINEURAL AS NEEDED
Status: DISCONTINUED | OUTPATIENT
Start: 2021-03-24 | End: 2021-03-24 | Stop reason: HOSPADM

## 2021-03-24 RX ORDER — SODIUM CHLORIDE 0.9 % (FLUSH) 0.9 %
5-40 SYRINGE (ML) INJECTION EVERY 8 HOURS
Status: DISCONTINUED | OUTPATIENT
Start: 2021-03-24 | End: 2021-03-24 | Stop reason: HOSPADM

## 2021-03-24 RX ORDER — SODIUM CHLORIDE 0.9 % (FLUSH) 0.9 %
5-40 SYRINGE (ML) INJECTION AS NEEDED
Status: DISCONTINUED | OUTPATIENT
Start: 2021-03-24 | End: 2021-03-24 | Stop reason: HOSPADM

## 2021-03-24 RX ADMIN — SODIUM CHLORIDE, SODIUM LACTATE, POTASSIUM CHLORIDE, AND CALCIUM CHLORIDE: 600; 310; 30; 20 INJECTION, SOLUTION INTRAVENOUS at 13:45

## 2021-03-24 RX ADMIN — PHENYLEPHRINE HYDROCHLORIDE 100 MCG: 10 INJECTION INTRAVENOUS at 14:18

## 2021-03-24 RX ADMIN — PROPOFOL 200 MCG/KG/MIN: 10 INJECTION, EMULSION INTRAVENOUS at 13:49

## 2021-03-24 RX ADMIN — PHENYLEPHRINE HYDROCHLORIDE 150 MCG: 10 INJECTION INTRAVENOUS at 14:03

## 2021-03-24 RX ADMIN — PHENYLEPHRINE HYDROCHLORIDE 50 MCG: 10 INJECTION INTRAVENOUS at 14:22

## 2021-03-24 RX ADMIN — PROPOFOL 20 MG: 10 INJECTION, EMULSION INTRAVENOUS at 13:50

## 2021-03-24 RX ADMIN — LIDOCAINE HYDROCHLORIDE 80 MG: 20 INJECTION, SOLUTION EPIDURAL; INFILTRATION; INTRACAUDAL; PERINEURAL at 13:49

## 2021-03-24 RX ADMIN — PROPOFOL 60 MG: 10 INJECTION, EMULSION INTRAVENOUS at 13:49

## 2021-03-24 RX ADMIN — PHENYLEPHRINE HYDROCHLORIDE 100 MCG: 10 INJECTION INTRAVENOUS at 13:52

## 2021-03-24 NOTE — H&P
History and Physical      Patient: Aicha Christopher    Physician: Gamal Alexis MD    Referring Physician: Sinai Degroot NP    Chief Complaint: For colonoscopy and EGD    History of Present Illness: Pt presents for colonoscopy and EGD. She has a tight XRT stricture requiring serial dilations. Last colo showed multiple large adenomas. History:  Past Medical History:   Diagnosis Date    Anxiety     Cancer ([facility] [place] [address] 11/05/2019    Throat tumor. Diagnosed 2006 was not surgical- had radiation. Was sent home on hospice. In remission- went to a [facility] and was healed per Care Everywhere pt summary    GERD (gastroesophageal reflux disease)     Hypothyroid     Ill-defined condition     Episodes of fainting    Osteonecrosis ([facility] [place] [address]     Psychiatric disorder     anxiety and panic disorder      Past Surgical History:   Procedure Laterality Date    COLONOSCOPY N/A 9/18/2020    COLONOSCOPY/ 20 performed by Van Ojeda MD at [facility] ENDOSCOPY     [address]      HX CHOLECYSTECTOMY      HX GI      PEG place - later removed    HX OTHER SURGICAL  09/18/2020    EGD with dilation    HX TUBAL LIGATION      HX VASCULAR ACCESS      Port placed - later removed      Social History     Socioeconomic History    Marital status: LEGALLY      Spouse name: Not on file    Number of children: Not on file    Years of education: Not on file    Highest education level: Not on file   Tobacco Use    Smoking status: Current Some Day Smoker    Smokeless tobacco: Never Used   Substance and Sexual Activity    Alcohol use: Never     Frequency: Never    Drug use: Not Currently      No family history on file. Medications:   Prior to Admission medications    Medication Sig Start Date End Date Taking? Authorizing Provider   citalopram (CeleXA) 40 mg tablet Take 40 mg by mouth daily. Take / use AM day of surgery  per anesthesia protocols.     Provider, Historical   OTHER,NON-FORMULARY, colloidial silver, essential oils and aloe vera dialy    Provider, Historical   levothyroxine (Synthroid) 100 mcg tablet Take 100 mcg by mouth Daily (before breakfast). Take / use AM day of surgery  per anesthesia protocols. Provider, Historical   ALPRAZolam (Xanax) 1 mg tablet Take 1 mg by mouth two (2) times daily as needed for Anxiety. Take / use AM day of surgery  per anesthesia protocols PRN    Provider, Historical       Allergies: Allergies   Allergen Reactions    Adhesive Rash    Hydrocodone Nausea and Vomiting    Penicillins Rash    Sulfa (Sulfonamide Antibiotics) Rash       Physical Exam:     Vital Signs: There were no vitals taken for this visit. .    General: no distress      Heart: regular   Lungs: unlabored   Abdominal: soft   Neurological: Grossly normal        Findings/Diagnosis: Dysphagia, Stricture, Colon polyps    Plan of Care/Planned Procedure: Colonoscopy, possible polypectomy and EGD with dilation. Pt/designee has reviewed the colonoscopy information sheet. Any questions have been discussed. They agree to proceed.       Signed:  Miguel Veliz MD   3/24/2021

## 2021-03-24 NOTE — PROCEDURES
PROCEDURE: Esophagogastroduodenoscopy    ENDOSCOPIST: Maria Fernanda Herrera M.D. PREOPERATIVE DIAGNOSIS: Dysphagia, Esophageal stricture    POSTOPERATIVE DIAGNOSIS: Esophageal stricture, Reflux esophagitis, Gastritis, Duodenitis     INSTRUMENTS: GIF H190    SEDATION: MAC    DESCRIPTION: After informed consent was obtained, the patient was taken to the endoscopy suite and placed in the left lateral decubitus position. Intravenous sedation was administered by the Anesthesia provider. After adequate sedation had been achieved the endoscope was inserted over the tongue and through the posterior pharynx under direct visualization down the esophagus to the stomach and into the second portion of the duodenum. The endoscopic was withdrawn from that point, performing a careful survey of the mucosa. Retroflexion was performed in the gastric fundus. FINDINGS:  Esophagus: Normal mucosa with LA grade A reflux esophagitis in the distal esophagus. Proximal stricture again noted for which the scope was able to traverse. Expected heme/trauma present after passing 12.8, 14, 15mm Savary. Stomach: Normal proximal mucosa with moderate gastritis and several erosions in the distal stomach. Bx's. Duodenum: Moderate duodenitis in the bulb with remaining mucosa being normal. Bx's.      Estimated blood loss:  0 cc-minimal    IMPRESSION:   Esophageal stricture  Reflux esophagitis  Gastritis  Duodenitis    PLAN:  - Soft diet today, advance as tolerated  - Repeat dilation in 6mos  - F/u path and treat for H pylori if +  - Avoid NSAIDs  - Start daily Omeprazole 40mg  - Proceed to qian Nicole MD

## 2021-03-24 NOTE — ANESTHESIA POSTPROCEDURE EVALUATION
Procedure(s):  ESOPHAGOGASTRODUODENOSCOPY (EGD)  COLONOSCOPY/ BMI 20  ESOPHAGEAL DILATION  ESOPHAGOGASTRODUODENAL (EGD) BIOPSY  ENDOSCOPIC POLYPECTOMY. total IV anesthesia    Anesthesia Post Evaluation      Multimodal analgesia: multimodal analgesia not used between 6 hours prior to anesthesia start to PACU discharge  Patient location during evaluation: bedside  Patient participation: complete - patient participated  Level of consciousness: awake and alert  Pain management: adequate  Airway patency: patent  Anesthetic complications: no  Cardiovascular status: hemodynamically stable  Respiratory status: acceptable  Hydration status: acceptable        INITIAL Post-op Vital signs:   Vitals Value Taken Time   /62 03/24/21 1432   Temp 36.5 °C (97.7 °F) 03/24/21 1432   Pulse 85 03/24/21 1435   Resp 13 03/24/21 1432   SpO2 99 % 03/24/21 1435   Vitals shown include unvalidated device data.

## 2021-03-24 NOTE — ANESTHESIA PREPROCEDURE EVALUATION
Relevant Problems   NEUROLOGY   (+) Anxiety disorder      GASTROINTESTINAL   (+) Acid reflux      ENDOCRINE   (+) Hypothyroid       Anesthetic History   No history of anesthetic complications            Review of Systems / Medical History  Patient summary reviewed and pertinent labs reviewed    Pulmonary          Smoker         Neuro/Psych         Psychiatric history     Cardiovascular                  Exercise tolerance: >4 METS     GI/Hepatic/Renal     GERD           Endo/Other      Hypothyroidism: well controlled    Pertinent negatives: Cancer: throat 15 years ago.     Other Findings              Physical Exam    Airway  Mallampati: II  TM Distance: 4 - 6 cm  Neck ROM: normal range of motion   Mouth opening: Normal     Cardiovascular  Regular rate and rhythm,  S1 and S2 normal,  no murmur, click, rub, or gallop             Dental    Dentition: Full upper dentures     Pulmonary  Breath sounds clear to auscultation               Abdominal         Other Findings            Anesthetic Plan    ASA: 2  Anesthesia type: total IV anesthesia          Induction: Intravenous  Anesthetic plan and risks discussed with: Patient

## 2021-03-24 NOTE — DISCHARGE INSTRUCTIONS
Gastrointestinal Esophagogastroduodenoscopy (EGD) - Upper Exam Discharge Instructions    1. Call Dr. Kathie Thomason at [phone] for any problems or questions. 2. Contact the doctor's office for follow up appointment as directed. 3. Medication may cause drowsiness for several hours, therefore, do not drive or operate machinery for remainder of the day. Do not make any legal decisions today. 4. No alcohol today. 5. Ordinarily, you may resume regular diet and activity after exam unless otherwise specified by your physician. 6. For mild soreness in your throat you may use Cepacol throat lozenges or warm salt-water gargles as needed. Gastrointestinal Colonoscopy/Flexible Sigmoidoscopy - Lower Exam Discharge Instructions  1. Because of air put into your colon during exam, you may experience some abdominal distension, relieved by the passage of gas, for several hours. 2. Contact your physician if you have any of the following:  a. Excessive amount of bleeding - large amount when having a bowel movement. Occasional specks of blood with bowel movement would not be unusual.  b. Severe abdominal pain  c. Fever or Chills    Any additional instructions:   - Soft diet today, advance as tolerated  - Repeat EGD with dilation in 6 months  - Avoid NSAIDs (ibuprofen, Advil, aleve, naproxen, etc.)  - Start daily omeprazole (Prilosec) 40mg  - Repeat colonoscopy in two years  - Daily Miralax, increase as needed      Instructions given to [employer] and other family members.

## 2021-03-24 NOTE — ROUTINE PROCESS
VSS at discharge. No complaints noted. Education reviewed and signed with patient and her fiance. Pt discharged via wheelchair by [facility] [place], [address] [place] [address]. Patient to be driven home by her Edward Stamp.

## 2021-03-24 NOTE — PROCEDURES
PROCEDURE: Colonoscopy    ENDOSCOPIST: Zuleyma Yang M.D. PREOPERATIVE DIAGNOSIS: Hx of multiple polyps    POSTOPERATIVE DIAGNOSIS: Polyps    SEDATION: MAC    INSTRUMENT: PCF h190    DESCRIPTION OF PROCEDURE:  After informed consent was obtained the patient was placed in the left lateral decubitus position. Intravenous sedation was administered as above. After adequate sedation had been achieved, digital rectal examination was performed. The colonoscope was then inserted through the anus and followed the course of the rectum and colon to the cecum, which was confirmed by visualization of the ileocecal valve and appendiceal orifice. The colonoscope was withdrawn from that point, performing a careful survey of the mucosa. Retroflexion was performed in the rectum. FINDINGS:  Normal appearing mucosa from rectum to cecum without inflammation. Four ascending polyps (7-13mm) removed with cold and hot snare. 7mm transverse polyp removed with cold snare. The prep was fair.     Estimated Blood Loss:  0 cc-min    IMPRESSION:  Colon polyps    PLAN:  - F/u path  - Repeat colo in one yr with 2 day prep  - Daily Miralax, increasing the dose as needed    REINALDO Mays MD

## 2022-03-19 PROBLEM — K21.9 ACID REFLUX: Status: ACTIVE | Noted: 2020-08-15

## 2022-03-19 PROBLEM — K92.2 GI BLEED: Status: ACTIVE | Noted: 2020-08-14

## 2022-03-19 PROBLEM — E03.9 HYPOTHYROID: Status: ACTIVE | Noted: 2020-08-14

## 2022-03-20 PROBLEM — E87.6 HYPOKALEMIA: Status: ACTIVE | Noted: 2020-08-15

## 2022-03-20 PROBLEM — F41.9 ANXIETY DISORDER: Status: ACTIVE | Noted: 2020-08-15

## (undated) DEVICE — CONNECTOR TBNG OD5-7MM O2 END DISP

## (undated) DEVICE — NDL PRT INJ NSAF BLNT 18GX1.5 --

## (undated) DEVICE — NEEDLE INJ 25GA P5MM SHFT L230CM SHTH DIA2.5MM S STL TEF

## (undated) DEVICE — BLOCK BITE AD 60FR W/ VELC STRP ADDRESSES MOST PT AND

## (undated) DEVICE — REM POLYHESIVE ADULT PATIENT RETURN ELECTRODE: Brand: VALLEYLAB

## (undated) DEVICE — Device: Brand: SPOT EX ENDOSCOPIC TATTOO

## (undated) DEVICE — 60 ML SYRINGE,CATHETER TIP: Brand: MONOJECT

## (undated) DEVICE — SYR 5ML 1/5 GRAD LL NSAF LF --

## (undated) DEVICE — KENDALL RADIOLUCENT FOAM MONITORING ELECTRODE RECTANGULAR SHAPE: Brand: KENDALL

## (undated) DEVICE — SYR 3ML LL TIP 1/10ML GRAD --

## (undated) DEVICE — CONTAINER PREFIL FRMLN 40ML --

## (undated) DEVICE — SNARE POLYP SM W13MMXL240CM SHTH DIA2.4MM OVL FLX DISP

## (undated) DEVICE — CANNULA NSL ORAL AD FOR CAPNOFLEX CO2 O2 AIRLFE

## (undated) DEVICE — FORCEPS BX L240CM JAW DIA2.8MM L CAP W/ NDL MIC MESH TOOTH